# Patient Record
(demographics unavailable — no encounter records)

---

## 2024-11-15 NOTE — DISCUSSION/SUMMARY
[FreeTextEntry1] :  VSD no prolapse small and restrictive likely no atrial communicatin follow up 6-9mo

## 2024-11-15 NOTE — CONSULT LETTER
[Today's Date] : [unfilled] [Name] : Name: [unfilled] [] : : ~~ [Today's Date:] : [unfilled] [Dear  ___:] : Dear Dr. [unfilled]: [Consult] : I had the pleasure of evaluating your patient, [unfilled]. My full evaluation follows. [Consult - Single Provider] : Thank you very much for allowing me to participate in the care of this patient. If you have any questions, please do not hesitate to contact me. [Sincerely,] : Sincerely, [FreeTextEntry4] : Jennyfer Bass MD [FreeTextEntry5] :  [address] [facility] [address] [FreeTextEntry6] :  [place] [address] [de-identified] : Katarzyna Hoyos MD, MPH, FAAP Pediatric Cardiologist Pediatric Intensivist  of Pediatrics [employer] at [facility] [phone] [place] [phone]

## 2024-11-15 NOTE — CONSULT LETTER
[Today's Date] : [unfilled] [Name] : Name: [unfilled] [] : : ~~ [Today's Date:] : [unfilled] [Dear  ___:] : Dear Dr. [unfilled]: [Consult] : I had the pleasure of evaluating your patient, [unfilled]. My full evaluation follows. [Consult - Single Provider] : Thank you very much for allowing me to participate in the care of this patient. If you have any questions, please do not hesitate to contact me. [Sincerely,] : Sincerely, [FreeTextEntry4] : Jennyfer Bass MD [FreeTextEntry5] :  [address] [FreeTextEntry6] :  [place] [address] [de-identified] : Katarzyna Hoyos MD, MPH, FAAP Pediatric Cardiologist Pediatric Intensivist  of Pediatrics [employer] at [facility] [phone] [place] [phone]

## 2024-11-15 NOTE — REASON FOR VISIT
[Follow-Up] : a follow-up visit for [Parents] : parents [FreeTextEntry3] : hx TRAPPC9, ASD, VSD, Microcephaly, dev delay

## 2024-11-15 NOTE — CLINICAL NARRATIVE
[Up to Date] : Up to Date [FreeTextEntry2] : Patient eats solids and pureed foods and drinks whole milk

## 2025-01-17 NOTE — PHYSICAL EXAM

## 2025-01-17 NOTE — HISTORY OF PRESENT ILLNESS
[Mother] : mother [Fruit] : fruit [Vegetables] : vegetables [Cereal] : cereal [Eggs] : eggs [Normal] : Normal [Bottle in bed] : Bottle in bed [Brushing teeth] : Brushing teeth [Water heater temperature set at <120 degrees F] : Water heater temperature set at <120 degrees F [Car seat in back seat] : Car seat in back seat [Carbon Monoxide Detectors] : Carbon monoxide detectors [Smoke Detectors] : Smoke detectors [Up to date] : Up to date [NO] : No [No] : Patient does not go to dentist yearly [Vitamin] : Primary Fluoride Source: Vitamin [Exposure to electronic nicotine delivery system] : No exposure to electronic nicotine delivery system [FreeTextEntry1] : Receiving OT/PT/speech Eating better now

## 2025-01-17 NOTE — PHYSICAL EXAM

## 2025-01-17 NOTE — DEVELOPMENTAL MILESTONES
[Not Passed] : not passed [Yes] : Completed. [Engages with others for play] : does not engage with others for play [Help dress and undress self] : does not help dress and undress self [Points to pictures in book] : does not point to pictures in book [Points to object of interest to] : does not point to object of interest to draw attention to it [Uses 6 to 10 words other than] : does not use 6 to 10 words other than names [Identifies at least 2 body parts] : does not indentify at least 2 body parts [Walks up with 2 feet per step] : does not walk up with 2 feet per step with hand held [Sits in small chair] : does not sit in small chair [Carries toy while walking] : does not carry toy while walking [Scribbles spontaneously] : does not scribble spontaneously [Throws small ball a few feet] : does not throw small ball a few feet while standing

## 2025-01-17 NOTE — DISCUSSION/SUMMARY
[Normal Growth] : growth [None] : No known medical problems [No Elimination Concerns] : elimination [No Feeding Concerns] : feeding [No Skin Concerns] : skin [Normal Sleep Pattern] : sleep [Delayed Fine Motor Skills] : delayed fine motor skills [Delayed Gross Motor Skills] : delayed gross motor skills [Delayed Social Skills] : delayed social skills [Delayed Language Skills] : delayed language skills [Delayed Problem Solving Skills] : delayed problem solving skills [Family Support] : family support [Child Development and Behavior] : child development and behavior [Language Promotion/Hearing] : language promotion/hearing [Toliet Training Readiness] : toliet training readiness [Safety] : safety [No Medications] : ~He/She~ is not on any medications [Parent/Guardian] : parent/guardian [FreeTextEntry1] : Continue whole cow's milk. Continue table foods, 3 meals with 2-3 snacks per day. . Brush teeth twice a day with soft toothbrush. Recommend visit to dentist. When in car, keep child in rear-facing car seats until age 2, or until  the maximum height and weight for seat is reached. Put toddler to sleep in own bed or crib. Help toddler to maintain consistent daily routines and sleep schedule. Toilet training discussed.

## 2025-06-26 NOTE — HISTORY OF PRESENT ILLNESS
[Parents] : parents [Fruit] : fruit [Vegetables] : vegetables [Cereal] : cereal [Normal] : Normal [Sippy cup use] : Sippy cup use [Brushing teeth] : Brushing teeth [Yes] : Patient goes to dentist yearly [Vitamin] : Primary Fluoride Source: Vitamin [<2 hrs of screen time] : Less than 2 hrs of screen time [No] : Not at  exposure [Water heater temperature set at <120 degrees F] : Water heater temperature set at <120 degrees F [Car seat in back seat] : Car seat in back seat [Smoke Detectors] : Smoke detectors [Carbon Monoxide Detectors] : Carbon monoxide detectors [NO] : No [At risk for exposure to TB] : Not at risk for exposure to Tuberculosis [FreeTextEntry1] : Making progress with ST,PT,OT and special instruction

## 2025-06-26 NOTE — CARE PLAN
[Care Plan reviewed and provided to patient/caregiver] : Care plan reviewed and provided to patient/caregiver [Care Plan reviewed every ___ weeks] : Care plan reviewed every [unfilled] weeks [Understands and communicates without difficulty] : Patient/Caregiver understands and communicates without difficulty [FreeTextEntry2] : Social stimulation Working on motor skills at home Ensure adequate caloric intake [FreeTextEntry3] : PT, OT, Speech, special instruction F/u with cardiology, ophthalmology Feeding therapy if needed

## 2025-06-26 NOTE — PHYSICAL EXAM
[Alert] : alert [No Acute Distress] : no acute distress [Anterior Polebridge Closed] : anterior fontanelle closed [Red Reflex Bilateral] : red reflex bilateral [PERRL] : PERRL [Normally Placed Ears] : normally placed ears [Auricles Well Formed] : auricles well formed [Clear Tympanic membranes with present light reflex and bony landmarks] : clear tympanic membranes with present light reflex and bony landmarks [No Discharge] : no discharge [Nares Patent] : nares patent [Palate Intact] : palate intact [Uvula Midline] : uvula midline [Tooth Eruption] : tooth eruption  [Supple, full passive range of motion] : supple, full passive range of motion [No Palpable Masses] : no palpable masses [Symmetric Chest Rise] : symmetric chest rise [Clear to Auscultation Bilaterally] : clear to auscultation bilaterally [Regular Rate and Rhythm] : regular rate and rhythm [S1, S2 present] : S1, S2 present [+2 Femoral Pulses] : +2 femoral pulses [Soft] : soft [NonTender] : non tender [Non Distended] : non distended [Normoactive Bowel Sounds] : normoactive bowel sounds [No Hepatomegaly] : no hepatomegaly [No Splenomegaly] : no splenomegaly [John 1] : John 1 [No Clitoromegaly] : no clitoromegaly [Normal Vaginal Introitus] : normal vaginal introitus [Patent] : patent [Normally Placed] : normally placed [No Abnormal Lymph Nodes Palpated] : no abnormal lymph nodes palpated [Symmetric Buttocks Creases] : symmetric buttocks creases [No Spinal Dimple] : no spinal dimple [NoTuft of Hair] : no tuft of hair [+2 Patella DTR] : +2 patella DTR [Cranial Nerves Grossly Intact] : cranial nerves grossly intact [No Rash or Lesions] : no rash or lesions [FreeTextEntry2] : Microcephaly [FreeTextEntry5] : wears glasses [FreeTextEntry8] : systolic murmur present [de-identified] : Low muscle tone

## 2025-06-26 NOTE — DEVELOPMENTAL MILESTONES
[Plays alongside other children] : does not play alongside other children [Takes off some clothing] : does not take off some clothing [Scoops well with spoon] : does not scoop well with spoon [Uses 50 words] : does not use 50 words [Combine 2 words into phrase or] : does not combine 2 words into phrase or sentences [Follows 2-step command] : does not follow 2-step command [Uses words that are 50% intelligible] : does not use words that are 50% intelligible to strangers [Kicks ball] : does not kick ball [Jumps off ground with 2 feet] : does not jump off ground with 2 feet [Runs with coordination] : does not run with coordination [Climbs up a ladder at a] : does not climb up a ladder at a playground [Stacks objects] : does not stack objects [Turns book pages] : does not turn book pages [Uses hands to turn objects] : does not use hands to turn objects [FreeTextEntry1] : smiles, babbles , sits ,Stands with stander Not crawling

## 2025-06-26 NOTE — DISCUSSION/SUMMARY
[Normal Growth] : growth [Normal Development] : development [None] : No known medical problems [No Elimination Concerns] : elimination [No Feeding Concerns] : feeding [No Skin Concerns] : skin [Normal Sleep Pattern] : sleep [Assessment of Language Development] : assessment of language development [Temperament and Behavior] : temperament and behavior [Toilet Training] : toilet training [TV Viewing] : tv viewing [Safety] : safety [No Medications] : ~He/She~ is not on any medications [Parent/Guardian] : parent/guardian [FreeTextEntry1] : Continue cow's milk. Continue table foods, 3 meals with 2-3 snacks per day.. Brush teeth twice a day with soft toothbrush. Recommend visit to dentist. When in car, keep child in rear-facing car seats until age 2, or until  the maximum height and weight for seat is reached. Put toddler to sleep in own bed. Help toddler to maintain consistent daily routines and sleep schedule.. Ensure home is safe. Use consistent,  Continue all therapies, F/U cardiology Once a year